# Patient Record
Sex: MALE | Race: OTHER
[De-identification: names, ages, dates, MRNs, and addresses within clinical notes are randomized per-mention and may not be internally consistent; named-entity substitution may affect disease eponyms.]

---

## 2018-06-19 ENCOUNTER — HOSPITAL ENCOUNTER (EMERGENCY)
Dept: HOSPITAL 80 - FED | Age: 48
LOS: 1 days | Discharge: PSYCHIATRIC OR SUBSTANCE USE FACILITY | End: 2018-06-20
Payer: SELF-PAY

## 2018-06-19 DIAGNOSIS — F10.129: ICD-10-CM

## 2018-06-19 DIAGNOSIS — E86.9: ICD-10-CM

## 2018-06-19 DIAGNOSIS — R45.851: Primary | ICD-10-CM

## 2018-06-19 DIAGNOSIS — Z85.47: ICD-10-CM

## 2018-06-19 LAB — PLATELET # BLD: 243 10^3/UL (ref 150–400)

## 2018-06-19 PROCEDURE — G0480 DRUG TEST DEF 1-7 CLASSES: HCPCS

## 2018-06-19 NOTE — EDPHY
General





- History


Smoking Status: Never smoked


Time Seen by Provider: 06/19/18 14:34


Narrative: 





CHIEF COMPLAINT: 


Alcohol withdrawal, suicidal





HISTORY OF PRESENT ILLNESS: 


Patient presents with complaints of alcohol withdrawal and suicidal ideation.  

He admits to drinking 20/30 beers per day, but stopped this morning.  He feels 

shaky.  He has also felt suicidal since yesterday.  He had a plan of driving 

himself off a dinesh into a [place].  He expresses intent to do so.  He says he 

wants to quit drinking but he cannot do so.  He has no headache or chest pain.  

He does not endorse any use of illicit substances.  Denies previous suicide 

attempt does have history of suicidal ideation.  No other associated complaints 

or modifying factors.





REVIEW OF SYSTEMS:


Ten systems reviewed and are negative unless otherwise noted in the HPI





PCP:


None





SPECIALISTS:


None





PAST MEDICAL HISTORY: 


Alcohol abuse, testicular cancer





PAST SURGICAL HISTORY:


No recent surgeries





SOCIAL HISTORY:


Admits to tobacco and heavy alcohol use with 20-30 beers per day.  Last intake 

of alcohol was this morning followed by 1 beer just prior to arrival





FAMILY HISTORY:


Noncontributory





EXAMINATION


General Appearance:  Alert, no distress.  Well-developed well-nourished.  

Anxious


Head: normocephalic, atraumatic


Eyes:  Pupils equal and round, no conjunctival pallor or injection


ENT, Mouth:  Mucous membranes moist


Neck:  Normal inspection, supple, non-tender


Respiratory:  Lungs are clear to auscultation


Cardiovascular:  Regular rate and rhythm.  No murmur


Gastrointestinal:  Abdomen is soft and nontender


Back: non-tender, no bony abnormalities


Neurological:  GCS 15. A&O, nonfocal, normal gait.  Mild tremor.  Normal finger-

to-nose.  No pronator drift.


Skin:  Warm and dry, no rash


Extremities:  Nontender, no pedal edema


Psychiatric:  Depressed mood and flat affect with suicidal ideation.  Plan of 

killing himself by driving off into a [place].  Alcohol use and abuse.  Denies 

substance abuse otherwise





DIFFERENTIAL DIAGNOSES:


Including but not limited to suicidal ideation, alcohol withdrawal, delirium 

tremens, alcohol intoxication, alcohol abuse








MDM:


2:35 p.m.


Alcohol use with reported drug tissue to suicidal ideation with plan of driving 

off a dinesh into a [place].  He does exhibit intent to harm self.  I do not 

appreciate any evidence of DTs, but he does have mild withdrawal symptoms.  

Vital signs are stable.  I have ordered the ED alcohol withdrawal protocol as 

well as placing him on an M1 hold, signed by Dr. Raygoza.  We will watch him 

closely and administer Ativan as needed per protocol.  Proceed with medical 

clearance and evaluation.  The patient is aware that he is on a hold.





4:00 p.m.


Patient's ETOH level is higher than will be evaluated.  He will need to remain 

here.  We will monitor him closely continue on our ED alcohol withdrawal 

protocol.





5:40 p.m.


Patient is awaiting evaluation.  At this time Dr. Raygoza will assume care the 

patient.  Please see her note for final disposition.





SUPERVISION:


Patient was independently examined, but I discussed the case with my primary 

supervising physician Dr. Raygoza.





 (Mervin Andrews)


Discussion: 





9pm: signed over to Dr. Wolfe at shift change.  Dispo pending. (Flori Raygoza)





- Objective


Vital Signs: 





 Initial Vital Signs











Temperature (C)  36.4 C   06/19/18 14:19


 


Heart Rate  91   06/19/18 14:19


 


Respiratory Rate  18   06/19/18 14:19


 


Blood Pressure  144/92 H  06/19/18 14:19


 


O2 Sat (%)  95   06/19/18 14:19








 











O2 Delivery Mode               Nasal Cannula


 


O2 (L/minute)                  2














Allergies/Adverse Reactions: 


 





No Known Allergies Allergy (Verified 06/19/18 14:18)


 








Home Medications: 














 Medication  Instructions  Recorded


 


NK [No Known Home Meds]  04/24/18











Laboratory Results: 





 Laboratory Results





 06/19/18 15:00 





 06/19/18 15:00 





 











  06/19/18 06/19/18 06/19/18





  15:00 15:00 15:00


 


WBC      8.28 10^3/uL 10^3/uL





     (3.80-9.50) 


 


RBC      5.42 10^6/uL 10^6/uL





     (4.40-6.38) 


 


Hgb      16.7 g/dL g/dL





     (13.7-17.5) 


 


Hct      45.4 % %





     (40.0-51.0) 


 


MCV      83.8 fL fL





     (81.5-99.8) 


 


MCH      30.8 pg pg





     (27.9-34.1) 


 


MCHC      36.8 g/dL H g/dL





     (32.4-36.7) 


 


RDW      11.9 % %





     (11.5-15.2) 


 


Plt Count      243 10^3/uL 10^3/uL





     (150-400) 


 


MPV      8.6 fL L fL





     (8.7-11.7) 


 


Neut % (Auto)      64.9 % %





     (39.3-74.2) 


 


Lymph % (Auto)      26.4 % %





     (15.0-45.0) 


 


Mono % (Auto)      7.2 % %





     (4.5-13.0) 


 


Eos % (Auto)      0.7 % %





     (0.6-7.6) 


 


Baso % (Auto)      0.6 % %





     (0.3-1.7) 


 


Nucleat RBC Rel Count      0.0 % %





     (0.0-0.2) 


 


Absolute Neuts (auto)      5.36 10^3/uL 10^3/uL





     (1.70-6.50) 


 


Absolute Lymphs (auto)      2.19 10^3/uL 10^3/uL





     (1.00-3.00) 


 


Absolute Monos (auto)      0.60 10^3/uL 10^3/uL





     (0.30-0.80) 


 


Absolute Eos (auto)      0.06 10^3/uL 10^3/uL





     (0.03-0.40) 


 


Absolute Basos (auto)      0.05 10^3/uL 10^3/uL





     (0.02-0.10) 


 


Absolute Nucleated RBC      0.00 10^3/uL 10^3/uL





     (0-0.01) 


 


Immature Gran %      0.2 % %





     (0.0-1.1) 


 


Immature Gran #      0.02 10^3/uL 10^3/uL





     (0.00-0.10) 


 


Sodium    133 mEq/L L mEq/L  





    (135-145)  


 


Potassium    4.1 mEq/L mEq/L  





    (3.3-5.0)  


 


Chloride    93 mEq/L L mEq/L  





    ()  


 


Carbon Dioxide    19 mEq/l L mEq/l  





    (22-31)  


 


Anion Gap    21 mEq/L H mEq/L  





    (8-16)  


 


BUN    2 mg/dL L mg/dL  





    (7-23)  


 


Creatinine    0.6 mg/dL L mg/dL  





    (0.7-1.3)  


 


Estimated GFR    > 60   





    


 


Glucose    106 mg/dL H mg/dL  





    ()  


 


Calcium    9.0 mg/dL mg/dL  





    (8.5-10.4)  


 


Urine Opiates Screen  NEGATIVE     





   (NEGATIVE)   


 


Urine Barbiturates  NEGATIVE     





   (NEGATIVE)   


 


Ur Phencyclidine Scrn  NEGATIVE     





   (NEGATIVE)   


 


Ur Amphetamine Screen  NEGATIVE     





   (NEGATIVE)   


 


U Benzodiazepines Scrn  NEGATIVE     





   (NEGATIVE)   


 


Urine Cocaine Screen  NEGATIVE     





   (NEGATIVE)   


 


U Marijuana (THC) Screen  NEGATIVE     





   (NEGATIVE)   


 


Ethyl Alcohol    311 mg/dL H mg/dL  





    (0-10)  











Medications Given: 





 





Lorazepam (Ativan Injection)  0 mg IVP Q1H PRN; Protocol


   PRN Reason: Alcohol Withdrawal w/IV access


   Stop: 06/20/18 02:34


   Last Admin: 06/19/18 15:31 Dose:  2 mg





Discontinued Medications





Sodium Chloride (Ns)  1,000 mls @ 0 mls/hr IV ONCE ONE; Wide Open


   PRN Reason: Protocol


   Stop: 06/19/18 15:31


   Last Admin: 06/19/18 15:31 Dose:  1,000 mls


Sodium Chloride (Ns)  1,000 mls @ 0 mls/hr IV EDNOW ONE; Wide Open


   PRN Reason: Protocol


   Stop: 06/19/18 16:27


   Last Admin: 06/19/18 16:50 Dose:  1,000 mls








Departure





- Departure


Referrals: 


NONE *PRIMARY CARE P,. [Primary Care Provider] - As per Instructions

## 2018-06-20 VITALS — SYSTOLIC BLOOD PRESSURE: 127 MMHG | DIASTOLIC BLOOD PRESSURE: 69 MMHG

## 2018-06-20 NOTE — ASMTTCLDSP
TLC Discharge Disposition

 

Disposition:                  Answers:  Transfer                              

Disposition Notes:            

Notes:

Pt was accepted at [facility] Dual Dx unit under the care of Dr Janice Chen.  

Type of Hold:                 Answers:  M1/72-hour Hold                       

Hold initiated by:            Answers:  ED Physician                          

For Transfers, Accepting      

Facility:                     [facility] Dual [facility]

For Transfers, Accepting      

Psychiatrist:                 Dr. Janice Chen

For Transfers, Reason         Pt needs inpt dual dx program

Patient is Being              

Transferred:                  

 

Date Signed:  06/20/2018 04:31 PM

Electronically Signed By:Bia Lara
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
## 2018-10-16 ENCOUNTER — HOSPITAL ENCOUNTER (INPATIENT)
Dept: HOSPITAL 80 - FED | Age: 48
LOS: 1 days | Discharge: LEFT BEFORE DISCHARGE | DRG: 369 | End: 2018-10-17
Attending: INTERNAL MEDICINE | Admitting: INTERNAL MEDICINE
Payer: SELF-PAY

## 2018-10-16 DIAGNOSIS — K22.6: Primary | ICD-10-CM

## 2018-10-16 DIAGNOSIS — Z23: ICD-10-CM

## 2018-10-16 DIAGNOSIS — K29.21: ICD-10-CM

## 2018-10-16 DIAGNOSIS — F10.230: ICD-10-CM

## 2018-10-16 LAB
INR PPP: 1.03 (ref 0.83–1.16)
PLATELET # BLD: 152 10^3/UL (ref 150–400)
PLATELET # BLD: 170 10^3/UL (ref 150–400)
PROTHROMBIN TIME: 13.7 SEC (ref 12–15)

## 2018-10-16 PROCEDURE — G0008 ADMIN INFLUENZA VIRUS VAC: HCPCS

## 2018-10-16 PROCEDURE — G0009 ADMIN PNEUMOCOCCAL VACCINE: HCPCS

## 2018-10-16 PROCEDURE — G0378 HOSPITAL OBSERVATION PER HR: HCPCS

## 2018-10-16 PROCEDURE — G0480 DRUG TEST DEF 1-7 CLASSES: HCPCS

## 2018-10-16 RX ADMIN — THERA TABS SCH EACH: TAB at 07:57

## 2018-10-16 RX ADMIN — PANTOPRAZOLE SODIUM SCH MG: 40 INJECTION, POWDER, FOR SOLUTION INTRAVENOUS at 20:08

## 2018-10-16 RX ADMIN — SODIUM CHLORIDE SCH MLS: 900 INJECTION, SOLUTION INTRAVENOUS at 08:45

## 2018-10-16 RX ADMIN — PANTOPRAZOLE SODIUM SCH MG: 40 INJECTION, POWDER, FOR SOLUTION INTRAVENOUS at 10:41

## 2018-10-16 RX ADMIN — FOLIC ACID SCH MG: 1 TABLET ORAL at 07:57

## 2018-10-16 RX ADMIN — HYOSCYAMINE SULFATE ONE MG: 0.12 TABLET ORAL at 06:23

## 2018-10-16 RX ADMIN — SODIUM CHLORIDE SCH MLS: 900 INJECTION, SOLUTION INTRAVENOUS at 15:29

## 2018-10-16 NOTE — HOSPPROG
Hospitalist Progress Note


Assessment/Plan: 


47 yo M w/ ETOH use d/o presents with GIB and withdrawal.





Plan: 


1. GIB - Most likely upper GI source noting predominance of melena. However, he 

has had some bright red blood both in vomit and stool as well. H. H/H within 

normal limits on admission despite 4 days of symptoms. Lactate 4.1 -> 2.6 s/p 

IVF.


- GI consulted who suspect alcoholic gastritis or MWT, do not recommend EGD at 

this time, start clear liquids, PPI IV BID for now if H/H remains stable will 

switch to PO PPI for 8 weeks


- Monitor CBC 


- Continue mIVF


- PPI IV BID





2. ETOH use d/o with acute withdrawal - Patient drinks 30 beers daily and has 

hx of prior, serious withdrawal. His last drink was 2 days ago. He is currently 

displaying signs of early withdrawal.


- CIWA protocol


- Daily MVI, folate, thiamine





3. Lactic acidosis - 4.1 -> 2.6 s/p IVF; etiology 2/2 #1.


- Continue IVF





4. Transaminitis - Mild, related to heavy ETOH use.





5. Hyponatremia - Hypovolemic in the setting of very poor PO intake and ongoing 

vomiting.


- Continue NS, prioritizing volume over water balance at this stage


- Monitor BMP daily





Diet - Clear Liquis


Code - Full


Ppx - SCDs


Dispo - Admit under observation status





Subjective: Patient reports feeling better this morning, has not had any 

hematemesis or BRBPR overnight


Objective: 


 Vital Signs











Temp Pulse Resp BP Pulse Ox


 


 37.6 C   106 H  15   103/67   94 


 


 10/16/18 08:38  10/16/18 08:38  10/16/18 08:38  10/16/18 08:38  10/16/18 08:38








 Laboratory Results





 10/16/18 10:12 





 











PT  13.7 SEC (12.0-15.0)   10/16/18  04:45    


 


INR  1.03  (0.83-1.16)   10/16/18  04:45    














- Physical Exam


Constitutional: no apparent distress


Eyes: PERRL


Ears, Nose, Mouth, Throat: dry mucous membranes


Cardiovascular: tachycardia


Respiratory: no respiratory distress


Gastrointestinal: soft, non-tender abdomen


Skin: warm


Musculoskeletal: normal joint ROM


Neurologic: AAOx3


Psychiatric: interacting appropriately





ICD10 Worksheet


Patient Problems: 


 Problems











Problem Status Onset


 


Abdominal pain Acute  


 


Alcohol withdrawal Acute  


 


GIB (gastrointestinal bleeding) Acute

## 2018-10-16 NOTE — GCON
GI INPATIENT CONSULTATION



DATE OF CONSULTATION:  10/16/2018



REASON FOR CONSULTATION:  I was kindly requested to see this patient by Dr. Sher Mathis in consultation for a chief complaint of gastrointestinal 
bleeding.



HISTORY OF PRESENT ILLNESS:  He is a 48-year-old male who is an alcoholic, 
drinking 30 beers a day.  With this, he has had poor oral intake for the last 4 
days.  He began to vomit some blood, as well as having dark stool.  He feels 
lightheaded and dizzy.  He feels that he is in mild withdrawal from alcohol.  
His last significant drink was 2 days ago, though he did try a few sips of beer 
this morning.  He denies taking any other medications regularly.  He denies 
fever, weight loss.



PAST MEDICAL HISTORY:  Otherwise noncontributory.



ALLERGIES:  No known drug allergies.



INPATIENT MEDICATIONS:  Include thiamine, folate, multivitamin with iron, 
Protonix 40 mg IV twice a day, normal saline 200 mL an hour.



SOCIAL HISTORY:  As above.



FAMILY HISTORY:  Negative for similar hematemesis.



REVIEW OF SYSTEMS:  Positive pertinent review of systems as per my HPI.  
Otherwise, complete review of systems is negative.



PHYSICAL EXAM:  CONSTITUTIONAL:  Well-nourished.  VITAL SIGNS:  Stable, 
including blood pressure.  SKIN:  Warm, dry.  EYES:  Pupils equal, round, and 
reactive to light and accommodation.  EARS, NOSE, MOUTH, and THROAT:  
Oropharynx is without masses.  Moist mucosa.  CARDIOVASCULAR:  Normal S2, 
normal PMI.  RESPIRATORY:  Lungs clear to auscultation and percussion 
anteriorly.  GASTROINTESTINAL:  Mild diffuse tenderness.  Normal bowel sounds.  
NEUROLOGIC:  Grossly nonfocal.  Cranial nerves grossly intact.  PSYCHIATRIC:  
Orientation, insight appropriate.  MUSCULOSKELETAL:  Strength grossly normal 
throughout, normal station.



LABORATORIES:  Include a hematocrit of 39.8%, normal platelet count.  Normal 
coags.  Sodium 128.  Normal total bilirubin and albumin.  Normal lipase.  AST 
102, with an ALT of 163.



IMAGING STUDIES:  CT scan of the abdomen and pelvis shows hepatomegaly and a 
fatty liver.



ASSESSMENT:  

1.  Hematemesis.  Suspect either alcoholic gastritis or a Kristy-Schumacher tear.  
Do not suspect varices, as he overall has good synthetic function.  At this 
point, no evidence of continued, active bleed.

2.  Alcoholism.



PLAN:  

1.  No upper endoscopy needed at this juncture.

2.  Clear liquids.  If his hematocrit remains relatively stable, suggestive 
that he no longer is having active bleeding, we will advance his diet.

3.  DT prophylaxis.

4.  Agree with Protonix IV q.12 hours for now.  If his hematocrits remain stable
, recommend eventually switching this to a generic oral proton pump inhibitor 
daily for 8 weeks.

5.  Recommend no outpatient alcohol.

6.  Serial hematocrits.

7.  Will decrease his IV fluids to 125 mL an hour, as he has a normal blood 
pressure.



For now, we will follow informally only.  Please call if we can be of further 
help, including signs of clinically significant, active bleeding. 



Thank you for allowing me to help in the management of this patient.





Job #:  [id number]

MTDD

## 2018-10-16 NOTE — PDGENHP
History and Physical





- Chief Complaint


Bleeding





- History of Present Illness


47 yo M w/ hx of ETOH use d/o presents with blood in vomit and stool. The 

patient has not been able to tolerate PO intake for 4 days. During that period 

he has been vomiting blood and his stool has been bloody. Initially both vomit 

and stool were melanotic but now both have become cristin red blood. He feels 

lightheaded and dizzy. He drinks 30 Dot beers daily. He has been doing this 

essentially since age 17. He has had prior episodes of severe withdrawal 

including seizures. He feels like he is in mild withdrawal currently. His last 

significant drinking was 2 days ago but he tried a few sips of a beer this 

morning. He denies other medical problems and does not take any medications 

regularly.





Case discussed with ED physician Dr. Jimenez; records reviewed in EMR.





History Information





- Allergies/Home Medication List


Allergies/Adverse Reactions: 








No Known Allergies Allergy (Verified 10/16/18 04:44)


 





Home Medications: 








NK [No Known Home Meds]  04/24/18 [Last Taken Unknown]





I have personally reviewed and updated: family history, medical history





- Past Medical History


no pertinent PMH





- Surgical History


Reports: no pertinent surgical hx





- Family History


Positive for: diabetes type II, CAD





- Social History


Smoking Status: Never smoked


Alcohol Use: Heavy (30 beers daily)





Review of Systems


Review of Systems: 





ROS: 10pt was reviewed & negative except for what was stated in HPI & below





Physical Exam


Physical Exam: 

















Temp Pulse Resp BP Pulse Ox


 


 37.2 C   112 H  18   118/77   96 


 


 10/16/18 04:25  10/16/18 04:58  10/16/18 04:58  10/16/18 04:58  10/16/18 04:58











Constitutional: appears nourished, uncomfortable


Eyes: PERRL, EOMI


Ears, Nose, Mouth, Throat: moist mucous membranes, other (Erythematous pharynx)


Cardiovascular: no murmur, rub, or gallop, tachycardia


Respiratory: no respiratory distress, clear to auscultation


Gastrointestinal: normoactive bowel sounds, tenderness (Diffuse)


Skin: warm, normal color


Musculoskeletal: full muscle strength, no muscle tenderness


Neurologic: AAOx3, CN II-XII Intact


Psychiatric: interacting appropriately, not anxious





Lab Data & Imaging Review





 10/16/18 04:45





 10/16/18 04:45














WBC  12.12 10^3/uL (3.80-9.50)  H  10/16/18  04:45    


 


RBC  4.67 10^6/uL (4.40-6.38)   10/16/18  04:45    


 


Hgb  14.9 g/dL (13.7-17.5)   10/16/18  04:45    


 


Hct  39.8 % (40.0-51.0)  L  10/16/18  04:45    


 


MCV  85.2 fL (81.5-99.8)   10/16/18  04:45    


 


MCH  31.9 pg (27.9-34.1)   10/16/18  04:45    


 


MCHC  37.4 g/dL (32.4-36.7)  H  10/16/18  04:45    


 


RDW  12.0 % (11.5-15.2)   10/16/18  04:45    


 


Plt Count  170 10^3/uL (150-400)   10/16/18  04:45    


 


MPV  9.0 fL (8.7-11.7)   10/16/18  04:45    


 


Neut % (Auto)  84.1 % (39.3-74.2)  H  10/16/18  04:45    


 


Lymph % (Auto)  5.7 % (15.0-45.0)  L  10/16/18  04:45    


 


Mono % (Auto)  9.4 % (4.5-13.0)   10/16/18  04:45    


 


Eos % (Auto)  0.0 % (0.6-7.6)  L  10/16/18  04:45    


 


Baso % (Auto)  0.4 % (0.3-1.7)   10/16/18  04:45    


 


Nucleat RBC Rel Count  0.0 % (0.0-0.2)   10/16/18  04:45    


 


Absolute Neuts (auto)  10.19 10^3/uL (1.70-6.50)  H  10/16/18  04:45    


 


Absolute Lymphs (auto)  0.69 10^3/uL (1.00-3.00)  L  10/16/18  04:45    


 


Absolute Monos (auto)  1.14 10^3/uL (0.30-0.80)  H  10/16/18  04:45    


 


Absolute Eos (auto)  0.00 10^3/uL (0.03-0.40)  L  10/16/18  04:45    


 


Absolute Basos (auto)  0.05 10^3/uL (0.02-0.10)   10/16/18  04:45    


 


Absolute Nucleated RBC  0.00 10^3/uL (0-0.01)   10/16/18  04:45    


 


Immature Gran %  0.4 % (0.0-1.1)   10/16/18  04:45    


 


Immature Gran #  0.05 10^3/uL (0.00-0.10)   10/16/18  04:45    


 


PT  13.7 SEC (12.0-15.0)   10/16/18  04:45    


 


INR  1.03  (0.83-1.16)   10/16/18  04:45    


 


APTT  25.9 SEC (23.0-38.0)   10/16/18  04:45    


 


VBG Lactic Acid  2.6 mmol/L (0.7-2.1)  H  10/16/18  05:59    


 


Sodium  128 mEq/L (135-145)  L  10/16/18  04:45    


 


Potassium  3.5 mEq/L (3.3-5.0)   10/16/18  04:45    


 


Chloride  83 mEq/L ()  L  10/16/18  04:45    


 


Carbon Dioxide  24 mEq/l (22-31)   10/16/18  04:45    


 


Anion Gap  21 mEq/L (6-14)  H  10/16/18  04:45    


 


BUN  17 mg/dL (7-23)   10/16/18  04:45    


 


Creatinine  0.6 mg/dL (0.7-1.3)  L  10/16/18  04:45    


 


Estimated GFR  > 60   10/16/18  04:45    


 


Glucose  125 mg/dL ()  H  10/16/18  04:45    


 


Calcium  9.4 mg/dL (8.5-10.4)   10/16/18  04:45    


 


Total Bilirubin  1.3 mg/dL (0.1-1.4)   10/16/18  04:45    


 


Conjugated Bilirubin  0.1 mg/dL (0.0-0.5)   10/16/18  04:45    


 


Unconjugated Bilirubin  1.2 mg/dL (0.0-1.1)  H  10/16/18  04:45    


 


AST  102 IU/L (17-59)  H  10/16/18  04:45    


 


ALT  163 IU/L (21-72)  H  10/16/18  04:45    


 


Alkaline Phosphatase  67 IU/L ()   10/16/18  04:45    


 


Total Protein  7.0 g/dL (6.3-8.2)   10/16/18  04:45    


 


Albumin  4.0 g/dL (3.5-5.0)   10/16/18  04:45    


 


Lipase  67 IU/L ()   10/16/18  04:45    


 


Ethyl Alcohol  11 mg/dL (0-10)  H  10/16/18  04:45    


 


Patient ABO/Rh  O POSITIVE   10/16/18  04:45    


 


Antibody Screen  NEGATIVE   10/16/18  04:45    








Imaging Review: 


CT A/P Prelim:





prelim CT AP 





Nothing acute. No SBO, FA, or FF 


Large fatty liver 


No evidence of pancreatitis 





d/w Dr. Jimenez at 5:40 am 





radha saleh 





Assessment & Plan


Assessment: 








47 yo M w/ ETOH use d/o presents with GIB and withdrawal.








Plan: 


1. GIB - Most likely upper GI source noting predominance of melena. However, he 

has had some bright red blood both in vomit and stool as well. He is mildly 

tachycardic currently but otherwise hemodynamically stable. H/H within normal 

limits on admission despite 4 days of symptoms. Lactate 4.1 -> 2.6 s/p IVF.


- Maintain NPO


- Monitor CBC closely, next at 1000


- Aggressive mIVF


- PPI IV BID


- GI consult for likely EGD today


2. ETOH use d/o with acute withdrawal - Patient drinks 30 beers daily and has 

hx of prior, serious withdrawal. His last drink was 2 days ago. He is currently 

displaying signs of early withdrawal.


- CIWA protocol


- Daily MVI, folate, thiamine


3. Lactic acidosis - 4.1 -> 2.6 s/p IVF; etiology 2/2 #1.


- Continue IVF


4. Transaminitis - Mild, related to heavy ETOH use.


5. Hyponatremia - Hypovolemic in the setting of very poor PO intake and ongoing 

vomiting.


- Continue NS, prioritizing volume over water balance at this stage


- Monitor BMP daily





Diet - NPO


Code - Full


Ppx - SCDs


Dispo - Admit under observation status

## 2018-10-16 NOTE — EDPHY
H & P


Time Seen by Provider: 10/16/18 04:37


HPI/ROS: 





HPI





CHIEF COMPLAINT:  Nausea and vomiting, hematemesis, dark tarry school





HISTORY OF PRESENT ILLNESS:  48-year-old male, states he is otherwise healthy 

without any significant medical history, presents emergency room with abdominal 

pain nausea and vomiting.  Patient states for the past 48 hr he has had ongoing 

vomiting.  Unable to tolerate p.o..  He typically drinks 30 beers per day.  He 

has been doing so for years.  He is unable to tolerate p.o. This evening.  He 

also complains of some abdominal pain.  He states every time he ingested 

something he vomits





Past Medical History:  Denies medical history





Past Surgical History:  Denies surgical history





Social History:  Daily alcohol use.  Up to 30 beers.  No illicit drugs or 

tobacco.





Family History:  Noncontributory








ROS   


REVIEW OF SYSTEMS:


10 Systems were reviewed and negative with the exception of the elements 

mentioned in the history of present illness.








Exam   


Constitutional nontoxic however slightly tremulous.  triage nursing summary 

reviewed, vital signs reviewed, awake/alert. 


Eyes   normal conjunctivae and sclera, EOMI, PERRLA.  Not icteric.


HENT   normal inspection, atraumatic, moist mucus membranes, no epistaxis, neck 

supple/ no meningismus, no raccoon eyes. 


Respiratory   clear to auscultation bilaterally, normal breath sounds, no 

respiratory distress, no wheezing. 


Cardiovascular   rate normal, regular rhythm, no murmur, no edema, distal 

pulses normal. 


Gastrointestinal  nontender palpation right upper quadrant, no rebound, no 

guarding, normal bowel sounds, no distension, no pulsatile mass. 


Genitourinary   no CVA tenderness. 


Musculoskeletal  no midline vertebral tenderness, full range of motion, no calf 

swelling, no tenderness of extremities, no meningismus, good pulses, 

neurovascularly intact.


Skin no jaundice  pink, warm, & dry, no rash, skin atraumatic. 


Neurologic   awake, alert and oriented x 3, AAOx3, moves all 4 extremities 

equally, motor intact, sensory intact, CN II-XII intact, normal cerebellar, 

normal vision, normal speech. 


Psychiatric   normal mood/affect. 


Heme/Lymph/Immune   no lymphadenopathy.





Differential diagnosis includes but is not limited to and in no particular order

:  Bowel obstruction, appendicitis, gallbladder disease, diverticulitis, colitis

, enteritis, perforated viscus, gastritis, GERD, esophagitis, urinary tract 

infection, pyelonephritis, kidney stones





Medical Decision Making:  Plan for this patient IV establishment IV fluid bolus

, Zofran for nausea, Ativan for tremors and anxiety and possible alcohol draw, 

check basic blood work, LFTs, lipase, CT scan abdomen pelvis with IV contrast.  

Type and screen.  Protonix bolus, and re-evaluate.





Re-evaluation:








Lactic acid noted be 4.1.  The patient is not septic.  Will not follow the 

septic protocol or septic shock protocol.  Lactic acid is elevated due to 

dehydration and underlying liver disease.





CT scan abdomen pelvis with IV contrast:  Enlarged liver.  Otherwise no acute 

inflammatory process.











0618:  Patient re-evaluated resting comfortably.  Vital signs are stable.


Patient's CT scan shows enlarged liver but otherwise unremarkable





Patient's blood work reviewed.





Patient to be admitted to the hospitalist service for potential GI bleeding 

given hematemesis and dark tarry stool.  Additionally the setting of alcoholism 

with alcohol withdrawal.








Spoke with the hospitalist service Dr. Dickson agrees to admit. 


Source: Patient





- Medical/Surgical History


Hx Asthma: No


Hx Chronic Respiratory Disease: No


Hx Diabetes: No


Hx Cardiac Disease: No


Hx Renal Disease: No


Hx Cirrhosis: No


Hx Alcoholism: Yes


Hx HIV/AIDS: No


Hx Splenectomy or Spleen Trauma: No


Other PMH: Alcoholism, testicular cancer, withdrawl sz





- Social History


Smoking Status: Never smoked


Constitutional: 


 Initial Vital Signs











Temperature (C)  37.2 C   10/16/18 04:25


 


Heart Rate  114 H  10/16/18 04:25


 


Respiratory Rate  16   10/16/18 04:25


 


Blood Pressure  140/86 H  10/16/18 04:25


 


O2 Sat (%)  97   10/16/18 04:25








 











O2 Delivery Mode               Room Air














Allergies/Adverse Reactions: 


 





No Known Allergies Allergy (Verified 10/16/18 04:44)


 








Home Medications: 














 Medication  Instructions  Recorded


 


NK [No Known Home Meds]  04/24/18














Medical Decision Making





- Data Points


Laboratory Results: 


 Laboratory Results





 10/16/18 04:45 





 10/16/18 04:45 








Medications Given: 


 





Folic Acid (Folic Acid)  1 mg PO DAILY RAYNA


   Stop: 04/14/19 08:59


   Last Admin: 10/16/18 07:57 Dose:  1 mg


Sodium Chloride (Ns)  1,000 mls @ 125 mls/hr IV CONT RAYNA


   Stop: 04/14/19 06:14


   Last Admin: 10/16/18 15:29 Dose:  1,000 mls


Lorazepam (Ativan Injection)  0 mg IVP Q1H PRN; Protocol


   PRN Reason: Alcohol Withdrawal w/IV access


   Stop: 04/14/19 06:17


   Last Admin: 10/17/18 03:15 Dose:  2 mg


Multivitamins (Tab-A-Pantera)  1 each PO DAILY RAYNA


   Stop: 04/14/19 08:59


   Last Admin: 10/16/18 07:57 Dose:  1 each


Ondansetron HCl (Zofran)  4 mg IVP Q4HRS PRN


   PRN Reason: Nausea/Vomiting, Can't Take PO


   Stop: 04/14/19 06:12


   Last Admin: 10/16/18 07:56 Dose:  4 mg


Pantoprazole Sodium (Protonix)  40 mg IVP BID RAYNA


   Stop: 04/14/19 08:59


   Last Admin: 10/16/18 20:08 Dose:  40 mg





Discontinued Medications





Al Hydroxide/Mg Hydroxide (Maalox Susp)  30 ml PO EDNOW ONE


   Stop: 10/16/18 06:19


   Last Admin: 10/16/18 06:24 Dose:  30 ml


Hyoscyamine Sulfate (Levsin, Hyomax-Sl)  0.125 mg PO EDNOW ONE


   Stop: 10/16/18 06:19


   Last Admin: 10/16/18 06:23 Dose:  0.125 mg


Sodium Chloride (Ns)  1,000 mls @ 0 mls/hr IV EDNOW ONE; Wide Open


   PRN Reason: Protocol


   Stop: 10/16/18 04:36


   Last Admin: 10/16/18 04:49 Dose:  1,000 mls


Sodium Chloride (Ns)  1,000 mls @ 0 mls/hr IV ONCE ONE


   PRN Reason: Wide Open


   Stop: 10/16/18 04:44


   Last Admin: 10/16/18 04:50 Dose:  1,000 mls


Lidocaine (Lidocaine 2% Viscous)  5 ml PO EDNOW ONE


   Stop: 10/16/18 06:19


   Last Admin: 10/16/18 08:42 Dose:  Not Given


Lidocaine (Lidocaine 2% Viscous)  15 ml PO ONCE ONE


   Stop: 10/16/18 06:29


   Last Admin: 10/16/18 06:31 Dose:  15 ml


Lorazepam (Ativan Injection)  1 mg IVP EDNOW ONE


   Stop: 10/16/18 04:43


   Last Admin: 10/16/18 04:51 Dose:  1 mg


Lorazepam (Ativan)  0 mg PO Q4H PRN; Protocol


   PRN Reason: Alcohol Withdrawal w/IV access


   Stop: 10/16/18 18:19


   Last Admin: 10/16/18 07:57 Dose:  1 mg


Pantoprazole Sodium (Protonix)  40 mg IVP EDNOW ONE


   Stop: 10/16/18 04:43


   Last Admin: 10/16/18 04:53 Dose:  40 mg








Departure





- Departure


Disposition: [facility] Inpatient Acute


Clinical Impression: 


Abdominal pain


Qualifiers:


 Abdominal location: upper abdomen, unspecified Qualified Code(s): R10.10 - 

Upper abdominal pain, unspecified





GIB (gastrointestinal bleeding)


Qualifiers:


 GI bleed type/associated pathology: unspecified gastrointestinal hemorrhage 

type Qualified Code(s): K92.2 - Gastrointestinal hemorrhage, unspecified





Alcohol withdrawal


Qualifiers:


 Complication of substance-induced condition: uncomplicated Qualified Code(s): 

F10.230 - Alcohol dependence with withdrawal, uncomplicated





Condition: Fair

## 2018-10-17 VITALS — SYSTOLIC BLOOD PRESSURE: 115 MMHG | DIASTOLIC BLOOD PRESSURE: 84 MMHG

## 2018-10-17 RX ADMIN — ACETAMINOPHEN PRN MG: 325 TABLET ORAL at 08:38

## 2018-10-17 RX ADMIN — SODIUM CHLORIDE SCH MLS: 900 INJECTION, SOLUTION INTRAVENOUS at 08:50

## 2018-10-17 RX ADMIN — ACETAMINOPHEN PRN MG: 325 TABLET ORAL at 18:14

## 2018-10-17 RX ADMIN — FOLIC ACID SCH MG: 1 TABLET ORAL at 08:39

## 2018-10-17 RX ADMIN — PANTOPRAZOLE SODIUM SCH MG: 40 INJECTION, POWDER, FOR SOLUTION INTRAVENOUS at 08:49

## 2018-10-17 RX ADMIN — THERA TABS SCH EACH: TAB at 08:40

## 2018-10-17 RX ADMIN — SODIUM CHLORIDE SCH MLS: 900 INJECTION, SOLUTION INTRAVENOUS at 20:45

## 2018-10-17 NOTE — ASMTCMCOM
CM Note

 

CM Note                       

Notes:

10/17/2018 Case Management Note



Pt admitted for GIB, alcohol abuse and withdrawl.  Discussed with RN, possible scope needed for 

changes in H&H.  Currently scoring high on CIWA.  Case management unable to meet with pt today.



Pt was screened by [employer] for [employer].



Alerted [facility] liason Rossy Barillas for post discharge case management needs.



Case Management d/c poc:  to be determined.



Case Management to follow.

 

Date Signed:  10/17/2018 03:16 PM

Electronically Signed By:Brooke Elaine RN

## 2018-10-17 NOTE — HOSPPROG
Hospitalist Progress Note


Assessment/Plan: 


47 yo M w/ ETOH use d/o presents with GIB and withdrawal.





Plan: 


1. GIB - Most likely upper GI source noting predominance of melena. However, he 

has had some bright red blood both in vomit and stool as well. 


- GI consulted who suspect alcoholic gastritis or MWT, did not recommend EGD at 

time of admission


- Hgb has downtrended to 11.4 this morning, from 14.9 on admission, will 

discuss with GI this morning to evaluate for potential EGD today


- Will make NPO for potential EGD


- Monitor CBC 


- Continue mIVF


- PPI IV BID





2. ETOH use d/o with acute withdrawal - Patient drinks 30 beers daily and has 

hx of prior, serious withdrawal. His last drink was 2 days ago. He is currently 

displaying signs of early withdrawal.


- CIWA protocol


- Daily MVI, folate, thiamine


- Discuss alcohol cessation 





3. Lactic acidosis - 4.1 -> 2.6 s/p IVF; etiology 2/2 #1.


- Continue IVF





4. Transaminitis - Mild, related to heavy ETOH use.





5. Hyponatremia - Hypovolemic in the setting of very poor PO intake and ongoing 

vomiting.


- Continue NS, prioritizing volume over water balance at this stage


- Monitor BMP daily





Diet - NPO


Code - Full


Ppx - SCDs


Dispo - Pending clinical course





Subjective: Patient reports episode of melena yesterday morning, no hematemesis 

or melena since


Objective: 


 Vital Signs











Temp Pulse Resp BP Pulse Ox


 


 36.6 C   80   20   110/66   97 


 


 10/17/18 07:55  10/17/18 07:55  10/17/18 07:55  10/17/18 07:55  10/17/18 07:55








 Laboratory Results





 10/17/18 08:04 





 











 10/16/18 10/17/18 10/18/18





 05:59 05:59 05:59


 


Intake Total  3216 


 


Balance  3216 








 











PT  13.7 SEC (12.0-15.0)   10/16/18  04:45    


 


INR  1.03  (0.83-1.16)   10/16/18  04:45    














- Physical Exam


Constitutional: no apparent distress


Eyes: PERRL


Ears, Nose, Mouth, Throat: moist mucous membranes


Cardiovascular: no murmur, rub, or gallop


Respiratory: no respiratory distress, clear to auscultation


Gastrointestinal: soft, non-tender abdomen


Genitourinary: no bladder tenderness


Skin: warm


Musculoskeletal: no joint effusions


Neurologic: AAOx3


Psychiatric: interacting appropriately





ICD10 Worksheet


Patient Problems: 


 Problems











Problem Status Onset


 


Abdominal pain Acute  


 


Alcohol withdrawal Acute  


 


GIB (gastrointestinal bleeding) Acute

## 2018-10-18 ENCOUNTER — HOSPITAL ENCOUNTER (EMERGENCY)
Dept: HOSPITAL 80 - FED | Age: 48
Discharge: HOME | End: 2018-10-18
Payer: SELF-PAY

## 2018-10-18 VITALS — DIASTOLIC BLOOD PRESSURE: 75 MMHG | SYSTOLIC BLOOD PRESSURE: 104 MMHG

## 2018-10-18 DIAGNOSIS — F10.230: Primary | ICD-10-CM

## 2018-10-18 DIAGNOSIS — E86.9: ICD-10-CM

## 2018-10-18 LAB — PLATELET # BLD: 131 10^3/UL (ref 150–400)

## 2018-10-18 PROCEDURE — G0480 DRUG TEST DEF 1-7 CLASSES: HCPCS

## 2018-10-18 NOTE — EDPHY
H & P


Time Seen by Provider: 10/18/18 03:16


HPI/ROS: 





HPI





CHIEF COMPLAINT:  Alcohol withdrawal, left AMA earlier.





HISTORY OF PRESENT ILLNESS:  Patient is a 48-year-old male, who I recently 

admitted for alcoholism, alcohol withdrawal and a GI bleed.  He left the 

hospital earlier this evening against medical advice removed both of his IVs 

and left.  He went home he broke into his own house and decided he wanted go 

drink alcohol.  He presents back to the emergency room after his wife called 

911 to get police involved.  The patient states he would like to go to detox.  

He denies any vomiting of blood or blood in his stool.  Denies abdominal pain.


Placed on a [facility] hold by [employer].





Past Medical History:  Alcoholism with daily alcohol use, up to 30 beers per 

day.





Past Surgical History:  No recent surgery





Social History:  Daily alcohol use.





Family History:  Noncontributory








ROS   


REVIEW OF SYSTEMS:


10 Systems were reviewed and negative with the exception of the elements 

mentioned in the history of present illness.








Exam   


Constitutional nontoxic.  No acute distress,  triage nursing summary reviewed, 

vital signs reviewed, awake/alert. 


Eyes   normal conjunctivae and sclera, EOMI, PERRLA. 


HENT   normal inspection, atraumatic, moist mucus membranes, no epistaxis, neck 

supple/ no meningismus, no raccoon eyes. 


Respiratory   clear to auscultation bilaterally, normal breath sounds, no 

respiratory distress, no wheezing. 


Cardiovascular   rate normal, regular rhythm, no murmur, no edema, distal 

pulses normal. 


Gastrointestinal   soft, non-tender, no rebound, no guarding, normal bowel 

sounds, no distension, no pulsatile mass. 


Genitourinary   no CVA tenderness. 


Musculoskeletal  no midline vertebral tenderness, full range of motion, no calf 

swelling, no tenderness of extremities, no meningismus, good pulses, 

neurovascularly intact.


Skin   pink, warm, & dry, no rash, skin atraumatic. 


Neurologic   awake, alert and oriented x 3, AAOx3, moves all 4 extremities 

equally, motor intact, sensory intact, CN II-XII intact, normal cerebellar, 

normal vision, normal speech. 


Psychiatric   normal mood/affect. 


Heme/Lymph/Immune   no lymphadenopathy.





Differential Diagnosis:  Includes but is not limited to in a particular order 

alcoholism, alcohol draw, electrolyte disturbance





Medical Decision Making:  Plan for this patient IV establishment with blood draw

, check basic blood work CBC chemistry, and if patient wants to go to detox 

will provide Librium and he is on a [facility] hold. 





Re-evaluation:








0400AM:  Patient's blood work reviewed and vital signs.  His heart rate is 

currently 96. Blood pressure 116/71.  He is not tremulous.  He is not altered.  

He is agreeable on going to the [facility].  


Will provide Librium for him.





H&H are stable.





He has not had any vomiting or dark melenic stools.





He is agreeable on going to detox.  





0407:  Patient resting comfortably.  Heart rate not tachycardic.  No tremors.  

Not hallucinating.  No tongue fasciculations.  Feels much better after IV 

fluids and Ativan.  Will be allowed to go to the [facility].


Source: Patient, EMS





- Medical/Surgical History


Hx Asthma: No


Hx Chronic Respiratory Disease: No


Hx Diabetes: No


Hx Cardiac Disease: No


Hx Renal Disease: No


Hx Cirrhosis: No


Hx Alcoholism: Yes


Hx HIV/AIDS: No


Hx Splenectomy or Spleen Trauma: No


Other PMH: Alcoholism, testicular cancer, withdrawl sz





- Social History


Smoking Status: Never smoked


Constitutional: 


 Initial Vital Signs











Temperature (C)  37.5 C   10/18/18 03:15


 


Heart Rate  110 H  10/18/18 03:15


 


Respiratory Rate  16   10/18/18 03:15


 


Blood Pressure  123/80 H  10/18/18 03:15


 


O2 Sat (%)  97   10/18/18 03:15








 











O2 Delivery Mode               Room Air














Allergies/Adverse Reactions: 


 





No Known Allergies Allergy (Verified 10/16/18 04:44)


 








Home Medications: 














 Medication  Instructions  Recorded


 


NK [No Known Home Meds]  04/24/18














Medical Decision Making





- Data Points


Laboratory Results: 


 Laboratory Results





 10/18/18 03:25 





 10/18/18 03:25 





 











  10/18/18 10/18/18





  03:25 03:25


 


WBC    5.43 10^3/uL 10^3/uL





    (3.80-9.50) 


 


RBC    4.14 10^6/uL L 10^6/uL





    (4.40-6.38) 


 


Hgb    13.2 g/dL L g/dL





    (13.7-17.5) 


 


Hct    36.2 % L %





    (40.0-51.0) 


 


MCV    87.4 fL fL





    (81.5-99.8) 


 


MCH    31.9 pg pg





    (27.9-34.1) 


 


MCHC    36.5 g/dL g/dL





    (32.4-36.7) 


 


RDW    11.9 % %





    (11.5-15.2) 


 


Plt Count    131 10^3/uL L 10^3/uL





    (150-400) 


 


MPV    9.1 fL fL





    (8.7-11.7) 


 


Neut % (Auto)    76.7 % H %





    (39.3-74.2) 


 


Lymph % (Auto)    15.7 % %





    (15.0-45.0) 


 


Mono % (Auto)    4.6 % %





    (4.5-13.0) 


 


Eos % (Auto)    1.7 % %





    (0.6-7.6) 


 


Baso % (Auto)    0.9 % %





    (0.3-1.7) 


 


Nucleat RBC Rel Count    0.0 % %





    (0.0-0.2) 


 


Absolute Neuts (auto)    4.17 10^3/uL 10^3/uL





    (1.70-6.50) 


 


Absolute Lymphs (auto)    0.85 10^3/uL L 10^3/uL





    (1.00-3.00) 


 


Absolute Monos (auto)    0.25 10^3/uL L 10^3/uL





    (0.30-0.80) 


 


Absolute Eos (auto)    0.09 10^3/uL 10^3/uL





    (0.03-0.40) 


 


Absolute Basos (auto)    0.05 10^3/uL 10^3/uL





    (0.02-0.10) 


 


Absolute Nucleated RBC    0.00 10^3/uL 10^3/uL





    (0-0.01) 


 


Immature Gran %    0.4 % %





    (0.0-1.1) 


 


Immature Gran #    0.02 10^3/uL 10^3/uL





    (0.00-0.10) 


 


Sodium  134 mEq/L L mEq/L  





   (135-145)  


 


Potassium  3.7 mEq/L mEq/L  





   (3.3-5.0)  


 


Chloride  96 mEq/L L mEq/L  





   ()  


 


Carbon Dioxide  27 mEq/l mEq/l  





   (22-31)  


 


Anion Gap  11 mEq/L mEq/L  





   (6-14)  


 


BUN  8 mg/dL mg/dL  





   (7-23)  


 


Creatinine  0.7 mg/dL mg/dL  





   (0.7-1.3)  


 


Estimated GFR  > 60   





   


 


Glucose  107 mg/dL H mg/dL  





   ()  


 


Calcium  9.2 mg/dL mg/dL  





   (8.5-10.4)  


 


Ethyl Alcohol  < 10 mg/dL mg/dL  





   (0-10)  











Medications Given: 


 








Discontinued Medications





Chlordiazepoxide (Librium 25 Mg Prepack#6)  1 btl TAKEHOME EDNOW ONE


   Stop: 10/18/18 03:45


   Last Admin: 10/18/18 04:03 Dose:  1 btl


Sodium Chloride (Ns)  1,000 mls @ 0 mls/hr IV EDNOW ONE; Wide Open


   PRN Reason: Protocol


   Stop: 10/18/18 03:16


   Last Admin: 10/18/18 03:20 Dose:  1,000 mls


Lorazepam (Ativan Injection)  1 mg IVP EDNOW ONE


   Stop: 10/18/18 03:41


   Last Admin: 10/18/18 03:40 Dose:  1 mg








Departure





- Departure


Disposition: Home, Routine, Self-Care


Clinical Impression: 


Alcohol withdrawal


Qualifiers:


 Complication of substance-induced condition: uncomplicated Qualified Code(s): 

F10.230 - Alcohol dependence with withdrawal, uncomplicated





Condition: Good


Instructions:  Chlordiazepoxide (By mouth), Alcohol Withdrawal (ED)


Referrals: 


NONE *PRIMARY CARE P,. [Primary Care Provider] - As per Instructions

## 2018-10-18 NOTE — PDDCSUM
Discharge Summary


Discharge Summary: 


Date of Admission: 10/16/2018


 Date of Discharge: 10/17/2018 (Left AMA)





Consults: GI





Procedure: CT Abd





Followup: PCP, GI





Hospital Course Problem List:


49 yo M w/ ETOH use d/o presents with GIB and alcohol withdrawal.





Plan: 


1. GIB - Most likely upper GI source noting predominance of melena. However, he 

has had some bright red blood both in vomit and stool as well. 


- GI consulted who suspect alcoholic gastritis or MWT, did not recommend EGD 


- Hgb has downtrended to 11.4 but remained stable from 11.2 previous, discussed 

with GI, Dr. Zuniga, who recommended no EGD and to continue monitoring


- PPI IV BID, plan to transition to PO PPI 


- Patient left AMA overnight





2. ETOH use d/o with acute withdrawal - Patient drinks 30 beers daily and has 

hx of prior, serious withdrawal. His last drink was 2 days ago. He is currently 

displaying signs of early withdrawal.


- CIWA protocol, was requiring high amounts of IV Ativan 


- Daily MVI, folate, thiamine


- Discussed alcohol cessation, was interested in discussing detox options 

however left AMA overnight





3. Lactic acidosis - 4.1 -> 2.6 s/p IVF; etiology 2/2 #1.


- Was on mIVF





4. Transaminitis - Mild, related to heavy ETOH use.





5. Hyponatremia - Hypovolemic in the setting of very poor PO intake and ongoing 

vomiting.


- Continue NS, prioritizing volume over water balance at this stage


- Monitor BMP daily

## 2018-10-18 NOTE — PDMN
Medical Necessity


Medical necessity: Change to inpt as of 10/17/18 @ 11:23. Pt meets inpt 

criteria per MD order and MCG M-180, Gastrointestinal Bleeding, Upper. 47 y/o 

admitted w/GI bleed, most likely upper w/ongoing bleeding, Hgb and HCT 

downtrended from 14.9, 39.8 on admission to 11.4, 31.7, melena and some bright 

red blood in vomit and stool.  GI consult, NPO for potential EGD. Pt also w/

acute alcohol WD, CIWA protocol, hyponatremia (Na 127). IVF, IV PPI, follow 

labs. Anticipate>2MN for ongoing management of GI bleed and acute alcohol WD.

## 2018-12-06 ENCOUNTER — HOSPITAL ENCOUNTER (EMERGENCY)
Dept: HOSPITAL 80 - FED | Age: 48
Discharge: HOME | End: 2018-12-06
Payer: SELF-PAY

## 2018-12-06 VITALS — DIASTOLIC BLOOD PRESSURE: 72 MMHG | SYSTOLIC BLOOD PRESSURE: 118 MMHG

## 2018-12-06 DIAGNOSIS — H66.90: ICD-10-CM

## 2018-12-06 DIAGNOSIS — R22.0: Primary | ICD-10-CM

## 2018-12-06 DIAGNOSIS — Z85.47: ICD-10-CM

## 2018-12-06 DIAGNOSIS — F10.20: ICD-10-CM

## 2018-12-06 DIAGNOSIS — J06.9: ICD-10-CM

## 2018-12-06 LAB
INR PPP: 1.03 (ref 0.83–1.16)
PLATELET # BLD: 164 10^3/UL (ref 150–400)
PROTHROMBIN TIME: 13.7 SEC (ref 12–15)

## 2018-12-06 PROCEDURE — G0480 DRUG TEST DEF 1-7 CLASSES: HCPCS

## 2018-12-06 NOTE — EDPHY
H & P


Stated Complaint: RUQ pn x 3D with L ear pain/jaw pain


Time Seen by Provider: 12/06/18 15:00


HPI/ROS: 





CHIEF COMPLAINT:  Abdominal pain, ear pain, throat pain, fever





HISTORY OF PRESENT ILLNESS:  48-year-old male with a significant history of 

alcoholism, daily alcohol drinker, history of testicular cancer treated with a 

orchiectomy, presents reporting several days of fever, cough, cold symptoms, 

pain in his left ear, swollen lymph node on the left, and abdominal pain.  

Patient apparently always has some right upper quadrant pain, this has 

worsened.  Also reports dark stools.  History of GI hemorrhage.


Patient reports fever but did not check his temperature.  He tells me has 

intermittent shortness of breath, cough productive of sputum, no vomiting or 

diarrhea, stools which are dark in color, no urinary complaints, no significant 

sore throat but pain in his ear.


Old records demonstrated admission for upper GI bleed with probable gastritis.  

Patient left AMA.  Did not receive an EGD.


Patient's last drink was several hours ago.





REVIEW OF SYSTEMS: 


A comprehensive 10 system review of systems was reviewed and is otherwise 

negative aside from elements mentioned in the history of present illness and 

medical decision making.





PAST MEDICAL HISTORY:  Alcoholism, upper GI hemorrhage, denies hepatitis or 

pancreatitis.  History of testicular cancer.





SOCIAL HISTORY:  .  Daily alcohol use.  Nonsmoker.  





VITAL SIGNS Reviewed by me.


GENERAL:  Well-developed, well-nourished, lying quietly, answers with brief 

answers.  Holding his right upper quadrant.


HEENT:  Atraumatic.  Eyes:  No icterus, no injection.  TM:  Right tympanic 

membrane is clear, left tympanic membrane slightly erythematous.  Mouth:  moist 

mucous membranes.  Posterior erythema and enlarged left tonsil.  Neck:  supple 

with left submandibular adenopathy.


LUNGS:  Clear to auscultation bilaterally, no wheezes, rhonchi or rales.


CARDIAC:  Regular rate and rhythm, no rubs, murmurs or gallops.


ABDOMEN:  Soft, significant right upper quadrant tenderness with voluntary 

guarding.  Nondistended.  No surgical scars.  No rebound.


RECTAL:  No hemorrhoids, good tone, no stool in the vault.


BACK:  No CVA tenderness.


EXTREMITIES:  No trauma. No edema.  Range of motion is normal throughout.


NEURO:  Alert and oriented,  grossly nonfocal.


SKIN:  Warm and dry, no rash.


PSYCHIATRIC:  Normal mentation, no agitation.











- Personal History


Current Tetanus/Diphtheria Vaccine: Yes





- Medical/Surgical History


Hx Asthma: No


Hx Chronic Respiratory Disease: No


Hx Diabetes: No


Hx Cardiac Disease: No


Hx Renal Disease: No


Hx Cirrhosis: No


Hx Alcoholism: Yes


Hx HIV/AIDS: No


Hx Splenectomy or Spleen Trauma: No


Other PMH: Alcoholism, testicular cancer, withdrawl sz





- Social History


Smoking Status: Never smoked


Constitutional: 


 Initial Vital Signs











Temperature (C)  36.4 C   12/06/18 14:41


 


Heart Rate  102 H  12/06/18 14:41


 


Respiratory Rate  18   12/06/18 14:41


 


Blood Pressure  151/102 H  12/06/18 14:41


 


O2 Sat (%)  95   12/06/18 14:41








 











O2 Delivery Mode               Room Air














Allergies/Adverse Reactions: 


 





No Known Allergies Allergy (Verified 12/06/18 14:41)


 








Home Medications: 














 Medication  Instructions  Recorded


 


Amoxicillin Trihydrate [Amoxil] 500 mg PO TID 7 Days  cap 12/06/18














Medical Decision Making





- Diagnostics


Imaging Results: 


CXR:


Impression: No acute cardiopulmonary process. Suggestion of healed or healing 

fractures on the 


right. 


 


 


               Dictated By:  Livan Ramos MD 


 


Abd CT with contrast


Impression: 


1. No acute findings. 


2. Subacute nondisplaced anterolateral right sixth rib fracture. 


3. Enlarged fatty liver. 


4. Additional findings as above. 


 


 Findings discussed with Mayelin Valdez MD, 12/6/2018 at 16:38. 


 


               Dictated By: Pramod Robbins MD 





Imaging: Discussed imaging studies w/ On call Radiologist, I viewed and 

interpreted images myself


ED Course/Re-evaluation: 





48-year-old male presents to the emergency department with complaints of ear 

pain, symptoms suggestive of an upper respiratory infection, and the swollen 

lymph node.  He also on exam seems to have a significant amount of right upper 

quadrant discomfort.





Laboratory evaluation demonstrates normal white count, chemistries remarkable 

for hyponatremia, hypochloremia, mildly elevated AST.  I reviewed the patient's 

records, he has had hyponatremia previously secondary to fluid overload.  The 

stool for occult blood was negative.


Patient did have a CT scan of his abdomen pelvis which demonstrates fatty liver 

but no acute findings.  No significant ascites.





Strep screen is negative.  Screening labs for sepsis is negative.





Patient will be placed on amoxicillin for treatment of lymphadenitis, otitis 

media, tonsillitis.


He requests to be discharged to the [facility] for alcohol treatment.





Differential Diagnosis: 





Differential diagnosis of the patient's symptom complex was considered 

including but not limited to viral upper respiratory infection, viral 

pharyngitis, strep pharyngitis, peritonsillar abscess, tonsillitis, epiglottitis

, influenza, and mononucleosis.


Differential diagnosis considered for the patient upper abdominal pain was 

considered included but was not limited to cholecystitis, gastritis, 

pancreatitis, kidney stones, urinary tract infections and other causes.





- Data Points


Laboratory Results: 


 Laboratory Results





 12/06/18 13:46 





 12/06/18 13:46 








Medications Given: 


 








Discontinued Medications





Chlordiazepoxide (Librium 25 Mg Prepack#6)  1 btl TAKEHOME EDNOW ONE


   Stop: 12/06/18 17:28


   Last Admin: 12/06/18 17:28 Dose:  1 btl


Fentanyl (Sublimaze)  50 mcg IVP EDNOW ONE


   Stop: 12/06/18 15:14


   Last Admin: 12/06/18 15:30 Dose:  50 mcg


Sodium Chloride (Ns)  1,000 mls @ 0 mls/hr IV ONCE ONE; Wide Open


   PRN Reason: Protocol


   Stop: 12/06/18 15:14


   Last Admin: 12/06/18 15:29 Dose:  1,000 mls








Departure





- Departure


Disposition: Home, Routine, Self-Care


Clinical Impression: 


 Lymphadenopathy





Alcohol intoxication


Qualifiers:


 Complication of substance-induced condition: uncomplicated Qualified Code(s): 

F10.920 - Alcohol use, unspecified with intoxication, uncomplicated





Upper respiratory infection


Qualifiers:


 URI type: unspecified viral URI Qualified Code(s): J06.9 - Acute upper 

respiratory infection, unspecified





Otitis media


Qualifiers:


 Otitis media type: unspecified Chronicity: acute Qualified Code(s): H66.90 - 

Otitis media, unspecified, unspecified ear





Condition: Fair


Instructions:  Chlordiazepoxide/Clidinium (By mouth), Lymphadenopathy (ED), 

Abuse of Alcohol (ED), Alcohol Dependence (ED)


Additional Instructions: 


You been given a prescription of amoxicillin.  Please take this as directed.  

It should help with the infection in your lymph node.  Will also help with any 

ear infection.





Please drink plenty of fluids.  Get plenty of rest.  Avoid excessive alcohol 

intake.





Use Tylenol and ibuprofen as needed for fever.





I recommend that she begin taking omeprazole, this is available over-the-counter

, it is told as a 2 week course and it will help with gastritis.


Referrals: 


NONE *PRIMARY CARE P,. [Primary Care Provider] - As per Instructions


Prescriptions: 


Amoxicillin Trihydrate [Amoxil] 500 mg PO TID 7 Days  cap

## 2019-01-01 ENCOUNTER — HOSPITAL ENCOUNTER (EMERGENCY)
Dept: HOSPITAL 80 - FED | Age: 49
Discharge: LEFT BEFORE DISCHARGE | End: 2019-01-01
Payer: COMMERCIAL

## 2019-01-01 VITALS — SYSTOLIC BLOOD PRESSURE: 131 MMHG | DIASTOLIC BLOOD PRESSURE: 90 MMHG

## 2019-01-01 DIAGNOSIS — Z53.20: Primary | ICD-10-CM

## 2019-01-01 DIAGNOSIS — F10.920: ICD-10-CM

## 2019-01-01 NOTE — EDPHY
H & P


Stated Complaint: wants to go to [facility] ----needs Librium-has w/d seizures


Time Seen by Provider: 01/01/19 14:50





- Medical/Surgical History


Hx Asthma: No


Hx Chronic Respiratory Disease: No


Hx Diabetes: No


Hx Cardiac Disease: No


Hx Renal Disease: No


Hx Cirrhosis: No


Hx Alcoholism: Yes


Hx HIV/AIDS: No


Hx Splenectomy or Spleen Trauma: No


Other PMH: Alcoholism, testicular cancer, withdrawl sz





- Social History


Smoking Status: Never smoked


Constitutional: 


 Initial Vital Signs











Temperature (C)  36.5 C   01/01/19 14:43


 


Heart Rate  89   01/01/19 14:43


 


Respiratory Rate  18   01/01/19 14:43


 


Blood Pressure  131/90 H  01/01/19 14:43


 


O2 Sat (%)  97   01/01/19 14:43








 











O2 Delivery Mode               Room Air














Allergies/Adverse Reactions: 


 





No Known Allergies Allergy (Verified 12/06/18 14:41)


 








Home Medications: 














 Medication  Instructions  Recorded


 


NK [No Known Home Meds]  01/01/19














Medical Decision Making


ED Course/Re-evaluation: 


CHIEF COMPLAINT:  No complaints





HISTORY OF PRESENT ILLNESS:  The patient is a 49 y/o male who presents with no 

complaints, states he is "alright," and wants to leave. He will not answer any 

further questions and is unwilling to participate in history or exam. He has 

walked out of the department.





REVIEW OF SYSTEMS:  





Unobtainable, patient not cooperative with assessment. 





PHYSICAL EXAM:  





General Appearance:  Alert, well hydrated, appropriate, and non-toxic 

appearing. Limited exam, patient did not participate in examination. 


Head:  Atraumatic without scalp tenderness or obvious injury


Neck:  Supple.


Respiratory:  No distress. 


Musculoskeletal:  Normal active ROM of all extremities, no visible trauma.


Neurological:  Alert, appropriate, and interactive.  Moving all extremities, 

normal gait. 


Skin:  No rashes, good turgor, no nodules on palpation.





PAST MEDICAL HISTORY:  Did not obtain





PAST SURGICAL HISTORY:  Did not obtain





SOCIAL HISTORY:  Did not obtain





MEDICAL DECISION MAKING:  This 49 y/o male told the triage nurse he needed 

Librium to go to the [facility]. When I attempt to interview him during history and 

offer Librium prescription, all he says is that he is okay and wants to leave. 

He has walked out of the department. 





Departure





- Departure


Disposition: Home, Routine, Self-Care


Clinical Impression: 


 Alcoholism





Condition: Good


Instructions:  Alcohol Use Disorder (ED)


Additional Instructions: 


Go directly to the [facility] if you wish to detox with assistance.





Return to the ED with any concerns. 


Referrals: 


[facility] 24 Hours [Outside] - As per Instructions


Report Scribed for: Zak Rubin


Report Scribed by: Enma Kat


Date of Report: 01/01/19


Time of Report: 15:05

## 2019-01-02 ENCOUNTER — HOSPITAL ENCOUNTER (EMERGENCY)
Dept: HOSPITAL 80 - FED | Age: 49
Discharge: HOME | End: 2019-01-02
Payer: SELF-PAY

## 2019-01-02 VITALS — DIASTOLIC BLOOD PRESSURE: 91 MMHG | SYSTOLIC BLOOD PRESSURE: 149 MMHG

## 2019-01-02 DIAGNOSIS — F10.920: Primary | ICD-10-CM

## 2019-01-02 NOTE — EDPHY
H & P


Stated Complaint: ETOH withdrawal, last drink @2045 today


Time Seen by Provider: 01/02/19 21:22


HPI/ROS: 


HPI:  This is 48-year-old male who presents with 





Chief Complaint: ETOH withdrawal, last drink @2045 today





Location:  Body


Quality:  Alcohol intoxication


Duration:  Unknown


Signs and Symptoms: no fever, no nausea, no vomiting, no hematemesis, no blood 

in stool, no abdominal bloating, no diarrhea, no back pain, no urinary symptoms

, no testicular/groin pain, no indigestion, no chest pain, no shortness of 

breath


Timing:  Acute on chronic


Severity:  Moderate


Context:  Patient has a history of alcoholism, drinking approximately 30 beers 

per day, presents accompanied by his significant other with complaints of 

alcohol intoxication and requesting detox.  His last drink was approximately 1 

hr prior to arrival.  He denies homicidal ideation, suicidal ideation, nausea, 

vomiting.  He reports that he feels jittery and slightly anxious.  [facility] is 

[facility].  Came to this emergency room 

yesterday but walked out without receiving treatment.


Modifying Factors:  None





Comment: 








ROS:  A comprehensive 10 system review of systems is otherwise negative aside 

from elements mentioned in the history of present illness. 





MEDICAL/SURGICAL/SOCIAL HISTORY: 


Medical history:  Alcoholism, testicular cancer, withdrawal sz


Surgical history:  Denies


Social history:  Never smoked.  Family history noncontributory.








CONSTITUTIONAL:  Intoxicated, cooperative  middle-aged male, awake and 

alert, no obvious distress


HEENT: Atraumatic and normocephalic, PERRL, EOMI.  Nares patent; no rhinorrhea;

  no nasal mucosal edema. Tympanic membranes clear. Oropharynx clear, no 

exudate and moist pink mucosa.  Airway patent.  No lymphadenopathy.  No 

meningismus.


Cardiovascular: Normal S1/S2, regular rate, regular rhythm, without murmur rub 

or gallop.


PULMONARY/CHEST:  Symmetrical and nontender. Clear to auscultation bilaterally. 

Good air movement. No accessory muscle usage.


ABDOMEN:  Soft, nondistended, nontender, no rebound, no guarding, no peritoneal 

signs, no masses or organomegaly. No CVAT.


EXTREMITIES:  2/2 pulses, strength 5/5, no deformities, no clubbing, no 

cyanosis or edema.


NEUROLOGICAL: no focal neuro deficits.  GCS 15.


SKIN: Warm and dry, no erythema. no rash.  Good capillary refill. 








Source: Patient, Family


Exam Limitations: Intoxication





- Personal History


Current Tetanus/Diphtheria Vaccine: Yes





- Medical/Surgical History


Hx Asthma: No


Hx Chronic Respiratory Disease: No


Hx Diabetes: No


Hx Cardiac Disease: No


Hx Renal Disease: No


Hx Cirrhosis: No


Hx Alcoholism: Yes


Hx HIV/AIDS: No


Hx Splenectomy or Spleen Trauma: No


Other PMH: Alcoholism, testicular cancer, withdrawl sz





- Social History


Smoking Status: Never smoked


Constitutional: 


 Initial Vital Signs











Temperature (C)  36.8 C   01/02/19 21:02


 


Heart Rate  96   01/02/19 21:02


 


Respiratory Rate  18   01/02/19 21:02


 


Blood Pressure  149/91 H  01/02/19 21:02


 


O2 Sat (%)  94   01/02/19 21:02








 











O2 Delivery Mode               Room Air














Allergies/Adverse Reactions: 


 





No Known Allergies Allergy (Verified 01/02/19 21:03)


 








Home Medications: 














 Medication  Instructions  Recorded


 


NK [No Known Home Meds]  01/01/19














Medical Decision Making


ED Course/Re-evaluation: 


Vital signs reviewed and stable upon arrival.


CIWA equals 0


Patient is currently intoxicated.  


He is ambulating on his own without any ataxia.


He is willing to go [facility] the [facility] with Librium prepack


Does not meet [address] or [facility] criteria. 








This patient was seen under the supervision of my secondary supervising 

physician.  I evaluated care for this patient independently.  Discussed this 

patient with Dr. Pereira who did not see the patient.  











Differential Diagnosis: 


Differential diagnosis includes but is not limited to alcohol abuse, alcohol 

intoxication, alcohol withdrawal seizures, delirium tremens.








- Data Points


Medications Given: 


 








Discontinued Medications





Chlordiazepoxide (Librium 25 Mg Prepack#6)  1 btl TAKEHOME EDNOW ONE


   Stop: 01/02/19 21:32


   Last Admin: 01/02/19 21:47 Dose:  1 btl








Departure





- Departure


Disposition: Home, Routine, Self-Care


Clinical Impression: 


 Alcoholic intoxication without complication, Alcoholism /alcohol abuse





Condition: Good


Instructions:  Chlordiazepoxide/Clidinium (By mouth), Alcohol Intoxication (ED)

, Abuse of Alcohol (ED)


Additional Instructions: 


Please refrain from drinking alcohol excessively.


You are medically clear to go to the [facility] for further 

detox.


Take Librium as needed for alcohol withdrawal symptoms.


Referrals: 


[facility] Hours [Outside] - As per Instructions

## 2019-01-25 ENCOUNTER — HOSPITAL ENCOUNTER (EMERGENCY)
Dept: HOSPITAL 80 - FED | Age: 49
Discharge: HOME | End: 2019-01-25
Payer: MEDICAID

## 2019-01-25 VITALS — DIASTOLIC BLOOD PRESSURE: 89 MMHG | SYSTOLIC BLOOD PRESSURE: 136 MMHG

## 2019-01-25 DIAGNOSIS — R20.2: Primary | ICD-10-CM

## 2019-01-25 NOTE — CPEKG
Test Reason : OPEN

Blood Pressure : ***/*** mmHG

Vent. Rate : 097 BPM     Atrial Rate : 097 BPM

   P-R Int : 164 ms          QRS Dur : 100 ms

    QT Int : 357 ms       P-R-T Axes : 073 041 051 degrees

   QTc Int : 454 ms

 

Sinus rhythm

 

Confirmed by McCollester, Laughlin ([id number]) on 1/25/2019 8:21:52 PM

 

Referred By: PHYSICIAN ED           Confirmed By:Laughlin McCollester

## 2019-01-25 NOTE — EDPHY
HPI/HX/ROS/PE/MDM


Narrative: 





CHIEF COMPLAINT: Transient paresthesias left arm





HPI: The patient is a 49 y/o male with a history of alcohol abuse and 

withdrawal seizures who complains of now-resolved left arm numbness. Last night 

he noticed numbness and tingling in his distal left biceps and left forearm and 

a "weird sensation" in his left fingertips. These symptoms resolved overnight, 

but returned this morning for a short time. He had no associated weakness, 

headache, vision changes, speech difficulty, or fever at any point. He did 

notice mild left lateral neck pain while symptoms were present. He denies 

recent trauma or illness. He is anxious he could be having a heart attack or 

stroke due to his history of seizures. 





REVIEW OF SYSTEMS:


A comprehensive 10 system review of systems is otherwise negative aside from 

elements mentioned in the history of present illness.





PMH: Alcoholism, withdrawal seizures, testicular cancer





SOCIAL HISTORY: Nonsmoker. Lives in [place]. Not employed. 





PHYSICAL EXAM:


General:Patient is alert, in no acute distress.


ENT:Eyes are normal to inspection.  ENT inspection normal.


Neck: Normal inspection.  Full range of motion.


Respiratory:No respiratory distress.  Breath sounds normal bilaterally.


Cardiovascular: Regular rate and rhythm.  Strong peripheral pulses.  Normal cap 

refill.


Abdomen:The abdomen is nontender to palpation. There are no peritoneal signs. 


Back: Normal to inspection.  No tenderness to palpation.


Skin: Normal color.  No rash.  Warm and dry.


Extremities: Normal appearance.  Full range of motion.


Neuro: Oriented x3.  Normal motor function.  Normal sensory function. No 

pronator drift. Normal  bilaterally. Normal finger-to-nose bilaterally. 

Normal strength biceps/triceps bilaterally. 


ED Course: 





Anxious-appearing 49 y/o male with history of alcohol withdrawal seizures who 

presents after 2 episodes of transient paresthesias in portions of his left 

arm. He has a completely normal neuro exam on assessment here. Plan for head CT 

to rule out acute intracranial process, ISTAT, EKG.





Labs unremarkable. 





The 12 lead EKG was interpreted by myself. See hard copy and/or "tracemaster" 

electronic copy for interpretation.





Head CT: negative. 





Reassessed patient and discussed findings. Exam remains normal. I've found no 

evidence of acute stroke or other neurologic process at this time. He will be 

discharged home with standard care and follow up instructions. Return 

precautions discussed. He is comfortable with this plan. 


MDM: 





This patient presents with what sounds like two episodes of patchy numbness 

without motor deficits to his left arm.  These symptoms have resolved prior to 

arriving at the ED and his motor and sensory exam is completely normal for me, 

ruling out CVA.  He does not complain of chest pain to me at all, and his ECG 

and troponin are negative.  Given history of alcoholism and possible occult 

head trauma, I ordered a CTH which is negative.  Given negative workup and 

normal exam, I think patient is safe for discharge home and outpatient workup. 

  I see no signs of SAH, epidrual bleed, ACS, cervical spine fracture. 





- Data Points


Imaging Results: 


 Imaging Impressions





Head CT  01/25/19 16:38


Impression: There is no acute abnormality identified on this unenhanced CT 

evaluation.


 


If there is further clinical concern regarding the patient's symptoms, MR 

imaging of the brain (with and without contrast) is suggested, if not otherwise 

contraindicated.


 


Findings were discussed with Jozef Gonzalez MD at 16:57, on 1/25/2019.


 











Imaging: Discussed imaging studies w/ On call Radiologist, I viewed and 

interpreted images myself


Laboratory Results: 


 











  01/25/19





  16:25


 


POC Troponin I  0.00 ng/mL ng/mL





   (0.00-0.08) 











Point of Care Test Results: 


 Chemistry











  01/25/19





  16:25


 


POC Troponin I  0.00 ng/mL ng/mL





   (0.00-0.08) 














General


Time Seen by Provider: 01/25/19 16:30


Initial Vital Signs: 


 Initial Vital Signs











Temperature (C)  36.5 C   01/25/19 16:01


 


Heart Rate  103 H  01/25/19 16:01


 


Respiratory Rate  18   01/25/19 16:01


 


Blood Pressure  142/101 H  01/25/19 16:01


 


O2 Sat (%)  97   01/25/19 16:01








 











O2 Delivery Mode               Room Air














Allergies/Adverse Reactions: 


 





No Known Allergies Allergy (Verified 01/25/19 16:01)


 








Home Medications: 














 Medication  Instructions  Recorded


 


NK [No Known Home Meds]  01/01/19














Departure





- Departure


Disposition: Home, Routine, Self-Care


Clinical Impression: 


 Arm paresthesia, left





Instructions:  Paresthesia (ED)


Additional Instructions: 


Follow up with your primary care provider next week for reevaluation.





Return for worsening of condition. 


Referrals: 


[facility],. [Clinic] - As per Instructions


Report Scribed for: Jozef Gonzalez


Report Scribed by: Enma Kat


Date of Report: 01/25/19


Time of Report: 16:40


Physician Review and Approval Statement: Portions of this note were transcribed 

by an ED scribe.  I personally performed the history, physical exam, and 

medical decision making; and confirm the accuracy of the information in the 

transcribed note.